# Patient Record
Sex: FEMALE | URBAN - METROPOLITAN AREA
[De-identification: names, ages, dates, MRNs, and addresses within clinical notes are randomized per-mention and may not be internally consistent; named-entity substitution may affect disease eponyms.]

---
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
## 2024-01-01 NOTE — PROGRESS NOTE PEDS - SUBJECTIVE AND OBJECTIVE BOX
Interval/Overnight Events:    VITAL SIGNS:  T(C): 35.9 (17 @ 06:00), Max: 39.3 (17 @ 18:00)  HR: 115 (17 @ 07:25) (102 - 147)  BP: 115/71 (17 @ 20:00) (107/75 - 119/63)  ABP: 104/62 (17 @ 07:00) (100/55 - 122/77)  ABP(mean): 75 (17 @ 07:00) (72 - 92)  RR: 14 (17 @ 07:00) (13 - 24)  SpO2: 100% (17 @ :25) (81% - 100%)  CVP(mm Hg): 6 (17 @ 07:00) (3 - 12)    ==================================RESPIRATORY===================================  [ ] FiO2: ___ 	[ ] Heliox: ____ 		[ ] BiPAP: ___   [ ] NC: __  Liters			[ ] HFNC: __ 	Liters, FiO2: __  [ ] End-Tidal CO2:  [ ] Mechanical Ventilation: Mode: SIMV with PS, RR (machine): 14, TV (machine): 350, FiO2: 45, PEEP: 12, PS: 10, ITime: 1, MAP: 15, PIP: 23  [ ] Inhaled Nitric Oxide:  VBG - ( 18 Dec 2017 09:37 )  pH: 7.37  /  pCO2: 25    /  pO2: 377   / HCO3: 18    / Base Excess: -9.9  /  SvO2: 99.8  / Lactate: 8.9    ABG - ( 20 Dec 2017 01:25 )  pH: 7.36  /  pCO2: 50    /  pO2: 159   / HCO3: 26    / Base Excess: 3.0   /  SaO2: 99.4  / Lactate: 2.7      Respiratory Medications:    [ ] Extubation Readiness Assessed  Comments:    ================================CARDIOVASCULAR================================  [ ] NIRS:  Cardiovascular Medications:      Cardiac Rhythm:	[ ] NSR		[ ] Other:  Comments:    ===========================HEMATOLOGIC/ONCOLOGIC=============================    Transfusions:	[ ] PRBC	[ ] Platelets	[ ] FFP		[ ] Cryoprecipitate    Hematologic/Oncologic Medications:  heparin   Infusion - Pediatric 0.06 Unit(s)/kG/Hr IV Continuous <Continuous>    [ ] DVT Prophylaxis:  Comments:    ===============================INFECTIOUS DISEASE===============================  Antimicrobials/Immunologic Medications:    RECENT CULTURES:        =========================FLUIDS/ELECTROLYTES/NUTRITION==========================  I&O's Summary    19 Dec 2017 07:01  -  20 Dec 2017 07:00  --------------------------------------------------------  IN: 2725.2 mL / OUT: 1133 mL / NET: 1592.2 mL      Daily Weight Gm: 60653 (18 Dec 2017 10:40)  12-20    167<HH>  |  127<H>  |  19  ----------------------------<  145<H>  3.6   |  27  |  0.85    Ca    8.0<L>      20 Dec 2017 03:30  Phos  2.1     12-20  Mg     2.4     12-20    TPro  5.2<L>  /  Alb  3.1<L>  /  TBili  0.4  /  DBili  x   /  AST  248<H>  /  ALT  273<H>  /  AlkPhos  105        Diet:	[ ] Regular	[ ] Soft		[ ] Clears	[ ] NPO  .	[ ] Other:  .	[ ] NGT		[ ] NDT		[ ] GT		[ ] GJT    Gastrointestinal Medications:  dextrose 5% + sodium chloride 0.225%. - Pediatric 1000 milliLiter(s) IV Continuous <Continuous>  famotidine IV Intermittent - Peds 20 milliGRAM(s) IV Intermittent every 12 hours  sodium chloride 0.9% -  250 milliLiter(s) IV Continuous <Continuous>    Comments:    =================================NEUROLOGY====================================  [ ] SBS:		[ ] VEENA-1:	[ ] BIS:  [ ] Adequacy of sedation and pain control has been assessed and adjusted    Neurologic Medications:  acetaminophen   Oral Liquid - Peds 650 milliGRAM(s) Enteral Tube every 6 hours    Comments:    OTHER MEDICATIONS:  Endocrine/Metabolic Medications:    Genitourinary Medications:    Topical/Other Medications:  petrolatum, white/mineral oil Ophthalmic Ointment - Peds 1 Application(s) Both EYES three times a day  polyvinyl alcohol 1.4%/povidone 0.6% Ophthalmic Solution - Peds 1 Drop(s) Both EYES three times a day      ==========================PATIENT CARE ACCESS DEVICES===========================  [ ] Peripheral IV  [ ] Central Venous Line	[ ] R	[ ] L	[ ] IJ	[ ] Fem	[ ] SC			Placed:   [ ] Arterial Line		[ ] R	[ ] L	[ ] PT	[ ] DP	[ ] Fem	[ ] Rad	[ ] Ax	Placed:   [ ] PICC:				[ ] Broviac		[ ] Mediport  [ ] Urinary Catheter, Date Placed:   [ ] Necessity of urinary, arterial, and venous catheters discussed    ================================PHYSICAL EXAM==================================  General:	In no acute distress  Respiratory:	Lungs clear to auscultation bilaterally. Good aeration. No rales,   .		rhonchi, retractions or wheezing. Effort even and unlabored.  CV:		Regular rate and rhythm. Normal S1/S2. No murmurs, rubs, or   .		gallop. Capillary refill < 2 seconds. Distal pulses 2+ and equal.  Abdomen:	Soft, non-distended. Bowel sounds present. No palpable   .		hepatosplenomegaly.  Skin:		No rash.  Extremities:	Warm and well perfused. No gross extremity deformities.  Neurologic:	Alert and oriented. No acute change from baseline exam.    IMAGING STUDIES:    Parent/Guardian is at the bedside:	[ ] Yes	[ ] No  Patient and Parent/Guardian updated as to the progress/plan of care:	[ ] Yes	[ ] No    [ ] The patient remains in critical and unstable condition, and requires ICU care and monitoring  [ ] The patient is improving but requires continued monitoring and adjustment of therapy Interval/Overnight Events:  hypernatremic, fluids changed  respiratory effort equal to vent rate    VITAL SIGNS:  T(C): 35.9 (17 @ 06:00), Max: 39.3 (17 @ 18:00)  HR: 115 (17 @ 07:25) (102 - 147)  BP: 115/71 (17 @ 20:00) (107/75 - 119/63)  ABP: 104/62 (17 @ 07:00) (100/55 - 122/77)  ABP(mean): 75 (17 @ 07:00) (72 - 92)  RR: 14 (17 @ 07:00) (13 - 24)  SpO2: 100% (17 @ :25) (81% - 100%)  CVP(mm Hg): 6 (17 @ 07:00) (3 - 12)    ==================================RESPIRATORY===================================  [ ] FiO2: ___ 	[ ] Heliox: ____ 		[ ] BiPAP: ___   [ ] NC: __  Liters			[ ] HFNC: __ 	Liters, FiO2: __  [x ] End-Tidal CO2: 37-49  [ x] Mechanical Ventilation: Mode: SIMV with PS, RR (machine): 14, TV (machine): 350, FiO2: 45, PEEP: 12, PS: 10, ITime: 1, MAP: 15, PIP: 23  [ ] Inhaled Nitric Oxide:    ABG - ( 20 Dec 2017 01:25 )  pH: 7.36  /  pCO2: 50    /  pO2: 159   / HCO3: 26    / Base Excess: 3.0   /  SaO2: 99.4  / Lactate: 2.7      Respiratory Medications:    [x ] Extubation Readiness Assessed NA  Comments:  (+) air leak  small amount secretions    ================================CARDIOVASCULAR================================  [ ] NIRS:  Cardiovascular Medications:      Cardiac Rhythm:	[x ] NSR		[ ] Other:  Comments:    ===========================HEMATOLOGIC/ONCOLOGIC=============================    Transfusions:	[ ] PRBC	[ ] Platelets	[ ] FFP		[ ] Cryoprecipitate    Hematologic/Oncologic Medications:  heparin   Infusion - Pediatric 0.06 Unit(s)/kG/Hr IV Continuous <Continuous>    [ ] DVT Prophylaxis:  Comments:    ===============================INFECTIOUS DISEASE===============================  Antimicrobials/Immunologic Medications:    RECENT CULTURES:        =========================FLUIDS/ELECTROLYTES/NUTRITION==========================  I&O's Summary    19 Dec 2017 07:01  -  20 Dec 2017 07:00  --------------------------------------------------------  IN: 2725.2 mL / OUT: 1133 mL / NET: 1592.2 mL      Daily Weight Gm: 89774 (18 Dec 2017 10:40)  12-    167<HH>  |  127<H>  |  19  ----------------------------<  145<H>  3.6   |  27  |  0.85    Electrolytes, Urine (. @ 06:30)    Sodium, Random Urine: 14: Reference range not established for this test meq/L    Potassium, Random Urine: 9.2: Reference range not established for this test meq/L    Chloride, Random Urine: 9: Reference range not established for this test mmol/L    Urinalysis (..17 @ 06:30)    Color: COLORL    Urine Appearance: CLEAR    Glucose: NEGATIVE: Glucose is reported in mg/dL.  Negative is defined as < 0.03 mg/dL.    Bilirubin: NEGATIVE    Ketone - Urine: NEGATIVE    Specific Gravity: 1.005    Blood: NEGATIVE    pH - Urine: 6.5    Protein, Urine: NEGATIVE mg/dL    Urobilinogen: NORMAL mg/dL    Nitrite: NEGATIVE    Leukocyte Esterase Concentration: NEGATIVE    Red Blood Cell - Urine: 0-2    White Blood Cell - Urine: 0-2    Mucus: FEW    Bacteria: FEW    Squamous Epithelial: OCC        Ca    8.0<L>      20 Dec 2017 03:30  Phos  2.1     12-20  Mg     2.4     12-20    TPro  5.2<L>  /  Alb  3.1<L>  /  TBili  0.4  /  DBili  x   /  AST  248<H>  /  ALT  273<H>  /  AlkPhos  105  12-19      Diet:	[ ] Regular	[ ] Soft		[ ] Clears	[ ] NPO  .	[ ] Other:  .	[ ] NGT		[ ] NDT		[ ] GT		[ ] GJT    Gastrointestinal Medications:  dextrose 5% + sodium chloride 0.225%. - Pediatric 1000 milliLiter(s) IV Continuous <Continuous>  famotidine IV Intermittent - Peds 20 milliGRAM(s) IV Intermittent every 12 hours  sodium chloride 0.9% -  250 milliLiter(s) IV Continuous <Continuous>    Comments:  coffee grounds in repogle    =================================NEUROLOGY====================================  [ ] SBS:		[ ] VEENA-1:	[ ] BIS:  [x ] Adequacy of sedation and pain control has been assessed and adjusted  no sedatives, not responsive    Neurologic Medications:  acetaminophen   Oral Liquid - Peds 650 milliGRAM(s) Enteral Tube every 6 hours    Comments:    OTHER MEDICATIONS:  Endocrine/Metabolic Medications:    Genitourinary Medications:    Topical/Other Medications:  petrolatum, white/mineral oil Ophthalmic Ointment - Peds 1 Application(s) Both EYES three times a day  polyvinyl alcohol 1.4%/povidone 0.6% Ophthalmic Solution - Peds 1 Drop(s) Both EYES three times a day      ==========================PATIENT CARE ACCESS DEVICES===========================  [ ] Peripheral IV  [x ] Central Venous Line	[ x] R	[ ] L	[ ] IJ	[ x] Fem	[ ] SC			Placed:   [x ] Arterial Line		[ ] R	[ x] L	[ ] PT	[ ] DP	[ ] Fem	[ x] Rad	[ ] Ax	Placed:   [ ] PICC:				[ ] Broviac		[ ] Mediport  [ ] Urinary Catheter, Date Placed:   [ ] Necessity of urinary, arterial, and venous catheters discussed    ================================PHYSICAL EXAM==================================  General:	In no acute distress  Respiratory:	Lungs clear to auscultation bilaterally. Good aeration. No rales,   .		rhonchi, retractions or wheezing. Effort even and unlabored.  CV:		Regular rate and rhythm. Normal S1/S2. No murmurs, rubs, or   .		gallop. Capillary refill < 2 seconds. Distal pulses 2+ and equal.  Abdomen:	Soft, non-distended. Bowel sounds present. No palpable   .		hepatosplenomegaly.  Skin:		No rash.  Extremities:	Warm and well perfused. No gross extremity deformities.  Neurologic:	Alert and oriented. No acute change from baseline exam.    IMAGING STUDIES:        Parent/Guardian is at the bedside:	[ x] Yes	[ ] No  Patient and Parent/Guardian updated as to the progress/plan of care:	[x ] Yes	[ ] No    [x ] The patient remains in critical and unstable condition, and requires ICU care and monitoring  [ ] The patient is improving but requires continued monitoring and adjustment of therapy Interval/Overnight Events:  hypernatremic, fluids changed  no longer with spontaneous respiratory effort     VITAL SIGNS:  T(C): 35.9 (17 @ 06:00), Max: 39.3 (17 @ 18:00)  HR: 115 (17 @ 07:25) (102 - 147)  BP: 115/71 (17 @ 20:00) (107/75 - 119/63)  ABP: 104/62 (17 @ 07:00) (100/55 - 122/77)  ABP(mean): 75 (17 @ 07:00) (72 - 92)  RR: 14 (17 @ 07:00) (13 - 24)  SpO2: 100% (17 @ :25) (81% - 100%)  CVP(mm Hg): 6 (17 @ 07:00) (3 - 12)    ==================================RESPIRATORY===================================  [ ] FiO2: ___ 	[ ] Heliox: ____ 		[ ] BiPAP: ___   [ ] NC: __  Liters			[ ] HFNC: __ 	Liters, FiO2: __  [x ] End-Tidal CO2: 37-49  [ x] Mechanical Ventilation: Mode: SIMV with PS, RR (machine): 14, TV (machine): 350, FiO2: 45, PEEP: 12, PS: 10, ITime: 1, MAP: 15, PIP: 23  [ ] Inhaled Nitric Oxide:    ABG - ( 20 Dec 2017 01:25 )  pH: 7.36  /  pCO2: 50    /  pO2: 159   / HCO3: 26    / Base Excess: 3.0   /  SaO2: 99.4  / Lactate: 2.7      Respiratory Medications:    [x ] Extubation Readiness Assessed: NA  Comments:  (+) air leak  small amount secretions    ================================CARDIOVASCULAR================================  [ ] NIRS:  Cardiovascular Medications:      Cardiac Rhythm:	[x ] NSR		[ ] Other:  Comments:    ===========================HEMATOLOGIC/ONCOLOGIC=============================    Transfusions:	[ ] PRBC	[ ] Platelets	[ ] FFP		[ ] Cryoprecipitate    Hematologic/Oncologic Medications:  heparin   Infusion - Pediatric 0.06 Unit(s)/kG/Hr IV Continuous <Continuous>    [ ] DVT Prophylaxis:  Comments:    ===============================INFECTIOUS DISEASE===============================  Antimicrobials/Immunologic Medications:    RECENT CULTURES:        =========================FLUIDS/ELECTROLYTES/NUTRITION==========================  I&O's Summary    19 Dec 2017 07:01  -  20 Dec 2017 07:00  --------------------------------------------------------  IN: 2725.2 mL / OUT: 1133 mL / NET: 1592.2 mL      Daily Weight Gm: 86115 (18 Dec 2017 10:40)  12-    167<HH>  |  127<H>  |  19  ----------------------------<  145<H>  3.6   |  27  |  0.85    Electrolytes, Urine (. @ 06:30)    Sodium, Random Urine: 14: Reference range not established for this test meq/L    Potassium, Random Urine: 9.2: Reference range not established for this test meq/L    Chloride, Random Urine: 9: Reference range not established for this test mmol/L    Urinalysis (12.20.17 @ 06:30)    Color: COLORL    Urine Appearance: CLEAR    Glucose: NEGATIVE: Glucose is reported in mg/dL.  Negative is defined as < 0.03 mg/dL.    Bilirubin: NEGATIVE    Ketone - Urine: NEGATIVE    Specific Gravity: 1.005    Blood: NEGATIVE    pH - Urine: 6.5    Protein, Urine: NEGATIVE mg/dL    Urobilinogen: NORMAL mg/dL    Nitrite: NEGATIVE    Leukocyte Esterase Concentration: NEGATIVE    Red Blood Cell - Urine: 0-2    White Blood Cell - Urine: 0-2    Mucus: FEW    Bacteria: FEW    Squamous Epithelial: OCC        Ca    8.0<L>      20 Dec 2017 03:30  Phos  2.1     12-20  Mg     2.4     12-20    TPro  5.2<L>  /  Alb  3.1<L>  /  TBili  0.4  /  DBili  x   /  AST  248<H>  /  ALT  273<H>  /  AlkPhos  105  -      Diet:	[ ] Regular	[ ] Soft		[ ] Clears	[x ] NPO  .	[ ] Other:  .	[ ] NGT		[ ] NDT		[ ] GT		[ ] GJT    Gastrointestinal Medications:  dextrose 5% + sodium chloride 0.225%. - Pediatric 1000 milliLiter(s) IV Continuous <Continuous>  famotidine IV Intermittent - Peds 20 milliGRAM(s) IV Intermittent every 12 hours  sodium chloride 0.9% -  250 milliLiter(s) IV Continuous <Continuous>    Comments:  coffee grounds in repogle    =================================NEUROLOGY====================================  [ ] SBS:		[ ] VEENA-1:	[ ] BIS:  [x ] Adequacy of sedation and pain control has been assessed and adjusted  no sedatives, not responsive    Neurologic Medications:  acetaminophen   Oral Liquid - Peds 650 milliGRAM(s) Enteral Tube every 6 hours    Comments:    OTHER MEDICATIONS:  Endocrine/Metabolic Medications:    Genitourinary Medications:    Topical/Other Medications:  petrolatum, white/mineral oil Ophthalmic Ointment - Peds 1 Application(s) Both EYES three times a day  polyvinyl alcohol 1.4%/povidone 0.6% Ophthalmic Solution - Peds 1 Drop(s) Both EYES three times a day      ==========================PATIENT CARE ACCESS DEVICES===========================  [ ] Peripheral IV  [x ] Central Venous Line	[ x] R	[ ] L	[ ] IJ	[ x] Fem	[ ] SC			Placed:   [x ] Arterial Line		[ ] R	[ x] L	[ ] PT	[ ] DP	[ ] Fem	[ x] Rad	[ ] Ax	Placed:   [ ] PICC:				[ ] Broviac		[ ] Mediport  [ ] Urinary Catheter, Date Placed:   [ ] Necessity of urinary, arterial, and venous catheters discussed    ================================PHYSICAL EXAM==================================  General:	In no acute distress  Respiratory:	Lungs clear to auscultation bilaterally. Good aeration. No rales,   .		rhonchi, retractions or wheezing. intubated, mechanically ventilated  CV:		Regular rate and rhythm. Normal S1/S2. No murmurs, rubs, or   .		gallop. Capillary refill < 2 seconds. Distal pulses 2+ and equal.  Abdomen:	Soft, non-distended. Bowel sounds present. No palpable   .		hepatosplenomegaly.  Skin:		No rash.  Extremities:	Warm and well perfused. No gross extremity deformities.  Neurologic:	No acute change from baseline exam.    IMAGING STUDIES:        Parent/Guardian is at the bedside:	[ x] Yes	[ ] No  Patient and Parent/Guardian updated as to the progress/plan of care:	[x ] Yes	[ ] No    [x ] The patient remains in critical and unstable condition, and requires ICU care and monitoring  [ ] The patient is improving but requires continued monitoring and adjustment of therapy    Total critical care time, not including procedure time: 75 min -Keep blanket away from the baby's face.